# Patient Record
Sex: MALE | Race: WHITE
[De-identification: names, ages, dates, MRNs, and addresses within clinical notes are randomized per-mention and may not be internally consistent; named-entity substitution may affect disease eponyms.]

---

## 2017-11-30 ENCOUNTER — HOSPITAL ENCOUNTER (EMERGENCY)
Dept: HOSPITAL 62 - ER | Age: 57
Discharge: HOME | End: 2017-11-30
Payer: COMMERCIAL

## 2017-11-30 VITALS — SYSTOLIC BLOOD PRESSURE: 155 MMHG | DIASTOLIC BLOOD PRESSURE: 91 MMHG

## 2017-11-30 DIAGNOSIS — M25.561: ICD-10-CM

## 2017-11-30 DIAGNOSIS — M25.461: Primary | ICD-10-CM

## 2017-11-30 DIAGNOSIS — M79.89: ICD-10-CM

## 2017-11-30 DIAGNOSIS — M79.604: ICD-10-CM

## 2017-11-30 PROCEDURE — 93971 EXTREMITY STUDY: CPT

## 2017-11-30 PROCEDURE — 99284 EMERGENCY DEPT VISIT MOD MDM: CPT

## 2017-11-30 NOTE — RADIOLOGY REPORT (SQ)
EXAM DESCRIPTION:  KNEE RIGHT 4 VIEWS



COMPLETED DATE/TIME:  11/30/2017 8:38 pm



REASON FOR STUDY:  pain/swelling



COMPARISON:  None.



NUMBER OF VIEWS:  Four views.



TECHNIQUE:  AP, lateral, and both oblique radiographic images acquired of the right knee.



LIMITATIONS:  None.



FINDINGS:  MINERALIZATION: Normal.

BONES: No acute fracture or dislocation.  No worrisome bone lesions.

JOINT: Joint effusion.

SOFT TISSUES: No soft tissue swelling.  No radio-opaque foreign body.

OTHER: No other significant finding.



IMPRESSION:  Joint effusion.  No acute fracture.



TECHNICAL DOCUMENTATION:  JOB ID:  [id number]

 2011 [employer]- All Rights Reserved

## 2017-11-30 NOTE — ER DOCUMENT REPORT
ED General





- General


Chief Complaint: Swelling of Lower Extremity


Stated Complaint: KNEE PAIN,SWOLLEN RIGHT LEG


Time Seen by Provider: 11/30/17 21:30


Mode of Arrival: Ambulatory


Information source: Patient, Dr. Office


Notes: 





57-year-old male presents with complaints of knee swelling calf pain over the 

past few days.  Patient notes he felt a pop sensation and then his knee got 

larger.  Patient notes no shortness of breath difficulty breathing, states his 

left leg looks fine.  Patient does note tenderness in the calf.  Patient states 

he has no history of DVTs or PEs or any other risk factors for


Patient seen at outside facility sent in for evaluation for concern for DVT


TRAVEL OUTSIDE OF THE [place] IN LAST 30 DAYS: No





- HPI


Onset: Last week


Onset/Duration: Persistent


Quality of pain: Sharp


Severity: Mild


Pain Level: 1


Associated symptoms: Leg swelling


Exacerbated by: Movement, Walking


Relieved by: Denies


Similar symptoms previously: No


Recently seen / treated by doctor: Yes





- Related Data


Allergies/Adverse Reactions: 


 





No Known Allergies Allergy (Unverified 11/30/17 20:15)


 











Past Medical History





- Social History


Smoking Status: Never Smoker


Cigarette use (# per day): No


Chew tobacco use (# tins/day): No


Smoking Education Provided: No


Family History: Reviewed & Not Pertinent


Patient has suicidal ideation: No


Patient has homicidal ideation: No





- Past Medical History


Cardiac Medical History: Reports: Hx Hypertension


Renal/ Medical History: Denies: Hx Peritoneal Dialysis





Review of Systems





- Review of Systems


Notes: 





REVIEW OF SYSTEMS:


CONSTITUTIONAL :  Denies fever,  chills, or sweats.  Denies recent illness.


EENT:   Denies eye, ear, throat, or mouth pain or symptoms.  Denies nasal or 

sinus congestion or discharge.  Denies throat, tongue, or mouth swelling or 

difficulty swallowing.


CARDIOVASCULAR:  Denies chest pain.  Denies palpitations or racing or irregular 

heart beat.  Denies ankle edema.


RESPIRATORY:  Denies cough, cold, or chest congestion.  Denies shortness of 

breath, difficulty breathing, or wheezing.


GASTROINTESTINAL:  Denies abdominal pain or distention.  Denies nausea, vomiting

, or diarrhea.  Denies blood in vomitus, stools, or per rectum.  Denies black, 

tarry stools.  Denies constipation.  


GENITOURINARY:  Denies difficulty urinating, painful urination, burning, 

frequency, blood in urine, or discharge.


MUSCULOSKELETAL: Admits to right knee swelling


HEMATOLOGIC :   Denies easy bruising or bleeding.


LYMPHATIC:  Denies swollen, enlarged glands.


NEUROLOGICAL:  Denies confusion or altered mental status.  Denies passing out 

or loss of consciousness.  Denies dizziness or lightheadedness.  Denies 

headache.  Denies weakness or paralysis or loss of use of either side.  Denies 

problems with gait or speech.  Denies sensory loss, numbness, or tingling.  

Denies seizures.


PSYCHIATRIC:  Denies anxiety or stress.  Denies depression, suicidal ideation, 

or homicidal ideation.





ALL OTHER SYSTEMS REVIEWED AND NEGATIVE.





Dictation was performed using Dragon voice recognition software 





PHYSICAL EXAMINATION:





GENERAL: Well-appearing, well-nourished and in no acute distress.





HEAD: Atraumatic, normocephalic.





EYES: Pupils equal round and reactive to light, extraocular movements intact, 

sclera anicteric, conjunctiva are normal.





ENT: Nares patent, oropharynx clear without exudates.  Moist mucous membranes.





NECK: Normal range of motion, supple without lymphadenopathy





LUNGS: Breath sounds clear to auscultation bilaterally and equal.  No wheezes 

rales or rhonchi.





HEART: Regular rate and rhythm without murmurs





ABDOMEN: Soft, nontender, nondistended abdomen.  No guarding, no rebound.  No 

masses appreciated.





Musculoskeletal: Effusion noted of the right knee there is tenderness of the 

calf





NEUROLOGICAL: Cranial nerves grossly intact.  Normal speech, normal gait.  

Normal sensory, motor exams 





PSYCH: Normal mood, normal affect.





SKIN: Warm, Dry, normal turgor, no rashes or lesions noted.





Physical Exam





- Vital signs


Vitals: 


 











Temp Pulse Resp BP Pulse Ox


 


 98.7 F   84   20   164/92 H  94 


 


 11/30/17 20:21  11/30/17 20:21  11/30/17 20:21  11/30/17 20:21  11/30/17 20:21














Course





- Re-evaluation


Re-evalutation: 





12/02/17 14:47


Patient was emergently sent for an ultrasound which noted no blood clot, given 

the location of the swelling and the symptoms occurring after a popping 

sensation I do believe this may be secondary to an internal knee injury.  

Patient notes it does hurt with movement of the knee but he does have full 

range of motion and no laxity.  He will be placed in Ace wrap given follow-up 

with orthopedics for further evaluation and care.  Very strict return 

precautions have been provided to the patient








After performing a Medical Screening Examination, I estimate there is LOW risk 

for INTRACRANIAL HEMORRHAGE, UNSTABLE SPINE FRACTURE, CENTRAL CORD SYNDROME, 

CAUDA EQUINA, THORACIC AORTIC DISSECTION, PNEUMOTHORAX, PERFORATED BOWEL, 

RUPTURED ABDOMINAL AORTIC ANEURYSM, ACUTE TENDON RUPTURE, COMPARTMENT SYNDROME, 

or OPEN FRACTURE, thus I consider the discharge disposition reasonable. Also, 

there is no evidence or peritonitis, sepsis, or toxicity.  I have reevaluated 

this patient multiple times and no significant life threatening changes are 

noted. The patient and I have discussed the diagnosis and risks, and we agree 

with discharging home to follow-up with their primary doctor with the 

understanding that symptoms and presentations can change. We also discussed 

returning to the Emergency Department immediately if new or worsening symptoms 

occur. We have discussed the symptoms which are most concerning (e.g., bloody 

stool, fever, changing or worsening pain, vomiting) that necessitate immediate 

return.





- Vital Signs


Vital signs: 


 











Temp Pulse Resp BP Pulse Ox


 


 98.1 F   84   16   155/91 H  99 


 


 11/30/17 23:24  11/30/17 23:24  11/30/17 23:24  11/30/17 23:24  11/30/17 23:24














- Diagnostic Test


Radiology reviewed: Image reviewed, Reports reviewed





Discharge





- Discharge


Clinical Impression: 


 Knee effusion, right





Knee pain, acute


Qualifiers:


 Laterality: right Qualified Code(s): M25.561 - Pain in right knee





Condition: Stable


Disposition: HOME, SELF-CARE


Instructions:  Suspected Internal Knee Injury ([facility]), Knee Effusion ([facility])


Referrals: 


RIGOBERTO YING MD [ACTIVE STAFF] - Follow up in 3-5 days

## 2017-11-30 NOTE — RADIOLOGY REPORT (SQ)
EXAM DESCRIPTION:  VENOUS UNILATERAL LOWER



COMPLETED DATE/TIME:  11/30/2017 10:53 pm



REASON FOR STUDY:  right calf pain



COMPARISON:  None.



TECHNIQUE:  Dynamic and static gray scale and color images acquired of the right leg venous system. S
elected spectral images acquired with additional compression and augmentation maneuvers. The contrala
teral common femoral vein and saphenofemoral junction were also imaged. Images stored on PACS.



LIMITATIONS:  None.



FINDINGS:  COMMON FEMORAL: Normal phasicity, compression and augmentation. No visualized echogenic ma
terial on gray scale. No defects on color images.

FEMORAL: Normal compression and augmentation. No visualized echogenic material on gray scale. No defe
cts on color images.

POPLITEAL: Normal compression, augmentation. No visualized echogenic material on gray scale. No defec
ts on color images.

CALF VESSELS: Normal compression, augmentation. No visualized echogenic material on gray scale. No de
fects on color images.

GSV and SSV: Normal compression, augmentation. No visualized echogenic material on gray scale. No def
ects on color images.

ANY DEEP VENOUS INSUFFICIENCY: Not evaluated.

ANY EVIDENCE OF POPLITEAL CYST: No.

OTHER: No other significant finding.

CONTRALATERAL COMMON FEMORAL VEIN AND SAPHENOFEMORAL JUNCTION:

Normal phasicity, compression and augmentation. No visualized echogenic material on gray scale. No de
fects on color images.



IMPRESSION:  NO EVIDENCE OF DVT OR SVT IN THE RIGHT LEG.



TECHNICAL DOCUMENTATION:  JOB ID:  [id number]

[id number]

 2011 [employer]- All Rights Reserved

## 2017-12-12 ENCOUNTER — HOSPITAL ENCOUNTER (OUTPATIENT)
Dept: HOSPITAL 62 - OD | Age: 57
End: 2017-12-12
Attending: ORTHOPAEDIC SURGERY
Payer: COMMERCIAL

## 2017-12-12 DIAGNOSIS — Z01.812: ICD-10-CM

## 2017-12-12 DIAGNOSIS — Z01.818: ICD-10-CM

## 2017-12-12 DIAGNOSIS — Z01.810: Primary | ICD-10-CM

## 2017-12-12 LAB
ANION GAP SERPL CALC-SCNC: 13 MMOL/L (ref 5–19)
APPEARANCE UR: (no result)
BASOPHILS # BLD AUTO: 0 10^3/UL (ref 0–0.2)
BASOPHILS NFR BLD AUTO: 0.2 % (ref 0–2)
BILIRUB UR QL STRIP: NEGATIVE
BUN SERPL-MCNC: 11 MG/DL (ref 7–20)
CALCIUM: 9.3 MG/DL (ref 8.4–10.2)
CHLORIDE SERPL-SCNC: 101 MMOL/L (ref 98–107)
CO2 SERPL-SCNC: 27 MMOL/L (ref 22–30)
CREAT SERPL-MCNC: 0.72 MG/DL (ref 0.52–1.25)
EOSINOPHIL # BLD AUTO: 0.1 10^3/UL (ref 0–0.6)
EOSINOPHIL NFR BLD AUTO: 0.6 % (ref 0–6)
ERYTHROCYTE [DISTWIDTH] IN BLOOD BY AUTOMATED COUNT: 12.7 % (ref 11.5–14)
GLUCOSE SERPL-MCNC: 107 MG/DL (ref 75–110)
GLUCOSE UR STRIP-MCNC: NEGATIVE MG/DL
HCT VFR BLD CALC: 43.7 % (ref 37.9–51)
HGB BLD-MCNC: 15.3 G/DL (ref 13.5–17)
HGB HCT DIFFERENCE: 2.2
KETONES UR STRIP-MCNC: NEGATIVE MG/DL
LYMPHOCYTES # BLD AUTO: 1.2 10^3/UL (ref 0.5–4.7)
LYMPHOCYTES NFR BLD AUTO: 11.1 % (ref 13–45)
MCH RBC QN AUTO: 34.4 PG (ref 27–33.4)
MCHC RBC AUTO-ENTMCNC: 35 G/DL (ref 32–36)
MCV RBC AUTO: 98 FL (ref 80–97)
MONOCYTES # BLD AUTO: 0.8 10^3/UL (ref 0.1–1.4)
MONOCYTES NFR BLD AUTO: 7.1 % (ref 3–13)
NEUTROPHILS # BLD AUTO: 8.7 10^3/UL (ref 1.7–8.2)
NEUTS SEG NFR BLD AUTO: 81 % (ref 42–78)
NITRITE UR QL STRIP: NEGATIVE
PH UR STRIP: 8 [PH] (ref 5–9)
POTASSIUM SERPL-SCNC: 4.1 MMOL/L (ref 3.6–5)
PROT UR STRIP-MCNC: NEGATIVE MG/DL
RBC # BLD AUTO: 4.45 10^6/UL (ref 4.35–5.55)
SODIUM SERPL-SCNC: 141.4 MMOL/L (ref 137–145)
SP GR UR STRIP: 1.01
UROBILINOGEN UR-MCNC: NEGATIVE MG/DL (ref ?–2)
WBC # BLD AUTO: 10.7 10^3/UL (ref 4–10.5)

## 2017-12-12 PROCEDURE — 36415 COLL VENOUS BLD VENIPUNCTURE: CPT

## 2017-12-12 PROCEDURE — 80048 BASIC METABOLIC PNL TOTAL CA: CPT

## 2017-12-12 PROCEDURE — 93010 ELECTROCARDIOGRAM REPORT: CPT

## 2017-12-12 PROCEDURE — 85025 COMPLETE CBC W/AUTO DIFF WBC: CPT

## 2017-12-12 PROCEDURE — 71020: CPT

## 2017-12-12 PROCEDURE — 81001 URINALYSIS AUTO W/SCOPE: CPT

## 2017-12-12 PROCEDURE — 93005 ELECTROCARDIOGRAM TRACING: CPT

## 2017-12-12 NOTE — RADIOLOGY REPORT (SQ)
EXAM DESCRIPTION:  CHEST PA/LATERAL



COMPLETED DATE/TIME:  12/12/2017 10:12 am



REASON FOR STUDY:  PRE OP



COMPARISON:  None.



EXAM PARAMETERS:  NUMBER OF VIEWS: two views

TECHNIQUE: Digital Frontal and Lateral radiographic views of the chest acquired.

RADIATION DOSE: NA

LIMITATIONS: none



FINDINGS:  LUNGS AND PLEURA: No opacities, masses or pneumothorax. No pleural effusion.

MEDIASTINUM AND HILAR STRUCTURES: No masses or contour abnormalities.

HEART AND VASCULAR STRUCTURES: Heart normal size.  No evidence for failure.

BONES: No acute findings.  Diffuse thoracic spondylotic change

HARDWARE: None in the chest.

OTHER: No other significant finding.



IMPRESSION:  NO SIGNIFICANT RADIOGRAPHIC FINDING IN THE CHEST.



TECHNICAL DOCUMENTATION:  JOB ID:  [id number]

 2011 [employer]- All Rights Reserved

## 2017-12-13 ENCOUNTER — HOSPITAL ENCOUNTER (OUTPATIENT)
Dept: HOSPITAL 62 - OROUT | Age: 57
Discharge: HOME | End: 2017-12-13
Attending: ORTHOPAEDIC SURGERY
Payer: COMMERCIAL

## 2017-12-13 VITALS — SYSTOLIC BLOOD PRESSURE: 130 MMHG | DIASTOLIC BLOOD PRESSURE: 86 MMHG

## 2017-12-13 DIAGNOSIS — M23.205: Primary | ICD-10-CM

## 2017-12-13 DIAGNOSIS — M25.561: ICD-10-CM

## 2017-12-13 DIAGNOSIS — M22.41: ICD-10-CM

## 2017-12-13 DIAGNOSIS — Z79.899: ICD-10-CM

## 2017-12-13 DIAGNOSIS — I10: ICD-10-CM

## 2017-12-13 DIAGNOSIS — M23.200: ICD-10-CM

## 2017-12-13 PROCEDURE — 0SBC4ZZ EXCISION OF RIGHT KNEE JOINT, PERCUTANEOUS ENDOSCOPIC APPROACH: ICD-10-PCS | Performed by: ORTHOPAEDIC SURGERY

## 2017-12-13 PROCEDURE — S0028 INJECTION, FAMOTIDINE, 20 MG: HCPCS

## 2017-12-13 PROCEDURE — 29880 ARTHRS KNE SRG MNISECTMY M&L: CPT

## 2017-12-13 RX ADMIN — FENTANYL CITRATE ONE MCG: 50 INJECTION INTRAMUSCULAR; INTRAVENOUS at 09:15

## 2017-12-13 RX ADMIN — FENTANYL CITRATE ONE MCG: 50 INJECTION INTRAMUSCULAR; INTRAVENOUS at 09:10

## 2017-12-13 NOTE — OPERATIVE REPORT
Operative Report


DATE OF SURGERY: 12/13/17


PREOPERATIVE DIAGNOSIS: Right medial meniscal tear


POSTOPERATIVE DIAGNOSIS: Right medial meniscal tear.  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 

malacia the medial compartment.  Intact ACL.  Grade 2 chondral malacia lateral 

compartment.  Lateral meniscal tear.  Grade 3 chondral malacia patellofemoral


OPERATION: Arthroscopic right partial medial and lateral meniscectomy


SURGEON: RIGOBERTO YING


ANESTHESIA: LMAC


ESTIMATED BLOOD LOSS: Minimal


PROCEDURE: 





With the patient supine on the operating table the right lower extremity is 

prepped and draped in sterile fashion.  The knee is insufflated with 

combination of Marcaine, Xylocaine, and epinephrine for anesthetic as well as 

vasoconstriction.  Subsequent medial lateral infrapatellar portals are created 

in the right knee for the introduction of arthroscope and debridement 

instrumentation.  The joint is examined in systematic fashion findings as 

above.  Using combination of basket Bernardo, mechanical shaver, electric 

frequency ablation probe a partial medial meniscectomy was performed from 

approximately 4:00 to 12:00 in the face of the dial.  Partial lateral 

meniscectomy was performed from approximately 8:00 to 12:00 in the face of the 

dial.  The joint is again examined in systematic fashion no new findings.  The 

interpretation is removed.  The portals reapproximated with interrupted nylon.  

A sterile compressive dressing was applied.  The patient returned to PACU in 

satisfactory condition.

## 2018-11-14 LAB
ANION GAP SERPL CALC-SCNC: 13 MMOL/L (ref 5–19)
APPEARANCE UR: (no result)
APTT PPP: YELLOW S
BILIRUB UR QL STRIP: NEGATIVE
BUN SERPL-MCNC: 16 MG/DL (ref 7–20)
CALCIUM: 9.5 MG/DL (ref 8.4–10.2)
CHLORIDE SERPL-SCNC: 101 MMOL/L (ref 98–107)
CO2 SERPL-SCNC: 27 MMOL/L (ref 22–30)
ERYTHROCYTE [DISTWIDTH] IN BLOOD BY AUTOMATED COUNT: 13 % (ref 11.5–14)
GLUCOSE SERPL-MCNC: 106 MG/DL (ref 75–110)
GLUCOSE UR STRIP-MCNC: NEGATIVE MG/DL
HCT VFR BLD CALC: 46.2 % (ref 37.9–51)
HGB BLD-MCNC: 16.2 G/DL (ref 13.5–17)
KETONES UR STRIP-MCNC: NEGATIVE MG/DL
MCH RBC QN AUTO: 34.5 PG (ref 27–33.4)
MCHC RBC AUTO-ENTMCNC: 35 G/DL (ref 32–36)
MCV RBC AUTO: 99 FL (ref 80–97)
NITRITE UR QL STRIP: NEGATIVE
PH UR STRIP: 8 [PH] (ref 5–9)
PLATELET # BLD: 187 10^3/UL (ref 150–450)
POTASSIUM SERPL-SCNC: 4.8 MMOL/L (ref 3.6–5)
PROT UR STRIP-MCNC: NEGATIVE MG/DL
RBC # BLD AUTO: 4.68 10^6/UL (ref 4.35–5.55)
SODIUM SERPL-SCNC: 141.4 MMOL/L (ref 137–145)
SP GR UR STRIP: 1.02
UROBILINOGEN UR-MCNC: NEGATIVE MG/DL (ref ?–2)
WBC # BLD AUTO: 6.1 10^3/UL (ref 4–10.5)

## 2018-11-14 NOTE — RADIOLOGY REPORT (SQ)
EXAM DESCRIPTION:  CHEST PA/LATERAL



COMPLETED DATE/TIME:  11/14/2018 9:27 am



REASON FOR STUDY:  PRE-OP



COMPARISON:  None.



EXAM PARAMETERS:  NUMBER OF VIEWS: two views

TECHNIQUE: Digital Frontal and Lateral radiographic views of the chest acquired.

RADIATION DOSE: NA

LIMITATIONS: none



FINDINGS:  LUNGS AND PLEURA: No opacities, masses or pneumothorax. No pleural effusion.

MEDIASTINUM AND HILAR STRUCTURES: No masses or contour abnormalities.

HEART AND VASCULAR STRUCTURES: Heart normal size.  No evidence for failure.

BONES: No acute findings.

HARDWARE: None in the chest.

OTHER: No other significant finding.



IMPRESSION:  NO SIGNIFICANT RADIOGRAPHIC FINDING IN THE CHEST.



TECHNICAL DOCUMENTATION:  JOB ID:  [id number]

 2011 [employer]- All Rights Reserved



Reading location - IP/workstation name: [email or web address]

## 2018-11-15 NOTE — EKG REPORT
SEVERITY:- OTHERWISE NORMAL ECG -

SINUS RHYTHM

BORDERLINE LEFT AXIS DEVIATION

:

Confirmed by: Martine Munoz 15-Nov-2018 09:00:49

## 2018-11-21 ENCOUNTER — HOSPITAL ENCOUNTER (OUTPATIENT)
Dept: HOSPITAL 62 - OROUT | Age: 58
Discharge: HOME | End: 2018-11-21
Attending: ORTHOPAEDIC SURGERY
Payer: COMMERCIAL

## 2018-11-21 VITALS — SYSTOLIC BLOOD PRESSURE: 156 MMHG | DIASTOLIC BLOOD PRESSURE: 84 MMHG

## 2018-11-21 DIAGNOSIS — Z79.899: ICD-10-CM

## 2018-11-21 DIAGNOSIS — M23.201: Primary | ICD-10-CM

## 2018-11-21 DIAGNOSIS — M22.42: ICD-10-CM

## 2018-11-21 DIAGNOSIS — M25.562: ICD-10-CM

## 2018-11-21 DIAGNOSIS — M23.204: ICD-10-CM

## 2018-11-21 DIAGNOSIS — Z79.1: ICD-10-CM

## 2018-11-21 DIAGNOSIS — Z79.891: ICD-10-CM

## 2018-11-21 DIAGNOSIS — I10: ICD-10-CM

## 2018-11-21 PROCEDURE — 93005 ELECTROCARDIOGRAM TRACING: CPT

## 2018-11-21 PROCEDURE — 81001 URINALYSIS AUTO W/SCOPE: CPT

## 2018-11-21 PROCEDURE — 71046 X-RAY EXAM CHEST 2 VIEWS: CPT

## 2018-11-21 PROCEDURE — 80048 BASIC METABOLIC PNL TOTAL CA: CPT

## 2018-11-21 PROCEDURE — 36415 COLL VENOUS BLD VENIPUNCTURE: CPT

## 2018-11-21 PROCEDURE — 85027 COMPLETE CBC AUTOMATED: CPT

## 2018-11-21 PROCEDURE — 93010 ELECTROCARDIOGRAM REPORT: CPT

## 2018-11-21 PROCEDURE — 29880 ARTHRS KNE SRG MNISECTMY M&L: CPT

## 2018-11-21 RX ADMIN — FENTANYL CITRATE ONE MCG: 50 INJECTION INTRAMUSCULAR; INTRAVENOUS at 08:09

## 2018-11-21 RX ADMIN — FENTANYL CITRATE ONE MCG: 50 INJECTION INTRAMUSCULAR; INTRAVENOUS at 08:14

## 2018-11-21 NOTE — OPERATIVE REPORT
Operative Report


DATE OF SURGERY: 11/21/18


PREOPERATIVE DIAGNOSIS: Left medial meniscal tear


POSTOPERATIVE DIAGNOSIS: Left medial meniscal tear.  Grade 3 chondral malacia 

the medial femoral compartment.  Intact ACL.  Grade I chondromalacia lateral 

compartment.  Lateral meniscal tear.  Grade 2 chondral malacia the 

patellofemoral compartment


OPERATION: Arthroscopic partial left medial and lateral meniscectomy


SURGEON: RIGOBERTO YING


ANESTHESIA: LMAC


ESTIMATED BLOOD LOSS: Minimal


PROCEDURE: 





With the patient supine on the operative table left lower extremities prepped 

and draped in a sterile fashion.  The knee is insufflated with a combination of 

Marcaine, Xylocaine, and epinephrine.  Subsequent medial and lateral 

infrapatellar portals are created for the introduction of arthroscope and 

debridements instrumentation.  The joint is examined in systematic fashion 

findings as above.  Using combination of basket Bernardo, mechanical shaver, 

electric frequency ablation probe a partial medial meniscectomy was performed 

from approximately 8:00 to 12:00 on the face of the dial.  Similarly a partial 

lateral meniscectomy was performed from approximately 5:00 to 12:00 on the face 

of the dial.  The joint is again examined in systematic fashion with no new 

findings.  The instrumentation was removed.  The portals reapproximated 

interrupted nylon.  A sterile compressive dressing is applied.  The patient's 

return to the PACU in satisfactory condition.

## 2018-11-21 NOTE — DISCHARGE SUMMARY
Discharge Summary (SDC)





- Discharge


Final Diagnosis: 





Left medial meniscal tear


Date of Surgery: 11/21/18


Discharge Date: 11/21/18


Condition: Good


Treatment or Instructions: 


Removed compressive wrap on Saturday.  Underlying OpSite can remain in place 

until you return to the office.


Prescriptions: 


Oxycodone HCl/Acetaminophen [Percocet 5-325 mg Tablet] 1 tab PO Q6 PRN #25 tab


 PRN Reason: 


Referrals: 


CAROLYN PEARL MD [Primary Care Provider] - 


Discharge Diet: As Tolerated, Regular


Respiratory Treatments at Home: Deep Breathing/Coughing


Discharge Activity: Activity As Tolerated, Balance Activity w/Rest, No tub bath


Home Care Assistance: None Needed


Report the Following to Your Physician Immediately: Shortness of Breath, Fever 

over 101 Degrees, Drainage-Foul Smelling

## 2020-04-30 ENCOUNTER — HOSPITAL ENCOUNTER (EMERGENCY)
Dept: HOSPITAL 62 - ER | Age: 60
Discharge: HOME | End: 2020-04-30
Payer: COMMERCIAL

## 2020-04-30 VITALS — SYSTOLIC BLOOD PRESSURE: 154 MMHG | DIASTOLIC BLOOD PRESSURE: 94 MMHG

## 2020-04-30 DIAGNOSIS — R53.1: ICD-10-CM

## 2020-04-30 DIAGNOSIS — S01.552A: ICD-10-CM
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
## 2020-04-30 DIAGNOSIS — I10: ICD-10-CM

## 2020-04-30 DIAGNOSIS — Z87.891: ICD-10-CM

## 2020-04-30 DIAGNOSIS — R20.2: ICD-10-CM

## 2020-04-30 DIAGNOSIS — R56.9: Primary | ICD-10-CM
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
## 2020-04-30 DIAGNOSIS — F10.29: ICD-10-CM

## 2020-04-30 DIAGNOSIS — Z91.048: ICD-10-CM
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
## 2020-04-30 LAB
ADD MANUAL DIFF: NO
ALBUMIN SERPL-MCNC: 4.5 G/DL (ref 3.5–5)
ALP SERPL-CCNC: 102 U/L (ref 38–126)
ANION GAP SERPL CALC-SCNC: 12 MMOL/L (ref 5–19)
AST SERPL-CCNC: 171 U/L (ref 17–59)
BASOPHILS # BLD AUTO: 0 10^3/UL (ref 0–0.2)
BASOPHILS NFR BLD AUTO: 0.4 % (ref 0–2)
BILIRUB DIRECT SERPL-MCNC: 0.2 MG/DL (ref 0–0.4)
BILIRUB SERPL-MCNC: 0.9 MG/DL (ref 0.2–1.3)
BUN SERPL-MCNC: 16 MG/DL (ref 7–20)
CALCIUM: 9.7 MG/DL (ref 8.4–10.2)
CHLORIDE SERPL-SCNC: 97 MMOL/L (ref 98–107)
CO2 SERPL-SCNC: 26 MMOL/L (ref 22–30)
EOSINOPHIL # BLD AUTO: 0 10^3/UL (ref 0–0.6)
EOSINOPHIL NFR BLD AUTO: 0.7 % (ref 0–6)
ERYTHROCYTE [DISTWIDTH] IN BLOOD BY AUTOMATED COUNT: 13.2 % (ref 11.5–14)
ETHANOL SERPL-MCNC: < 10 MG/DL
GLUCOSE SERPL-MCNC: 137 MG/DL (ref 75–110)
HCT VFR BLD CALC: 43.8 % (ref 37.9–51)
HGB BLD-MCNC: 15.7 G/DL (ref 13.5–17)
LYMPHOCYTES # BLD AUTO: 1.5 10^3/UL (ref 0.5–4.7)
LYMPHOCYTES NFR BLD AUTO: 21.9 % (ref 13–45)
MCH RBC QN AUTO: 35 PG (ref 27–33.4)
MCHC RBC AUTO-ENTMCNC: 35.8 G/DL (ref 32–36)
MCV RBC AUTO: 98 FL (ref 80–97)
MONOCYTES # BLD AUTO: 0.7 10^3/UL (ref 0.1–1.4)
MONOCYTES NFR BLD AUTO: 9.8 % (ref 3–13)
NEUTROPHILS # BLD AUTO: 4.8 10^3/UL (ref 1.7–8.2)
NEUTS SEG NFR BLD AUTO: 67.2 % (ref 42–78)
PLATELET # BLD: 170 10^3/UL (ref 150–450)
POTASSIUM SERPL-SCNC: 3.5 MMOL/L (ref 3.6–5)
PROT SERPL-MCNC: 8 G/DL (ref 6.3–8.2)
RBC # BLD AUTO: 4.49 10^6/UL (ref 4.35–5.55)
TOTAL CELLS COUNTED % (AUTO): 100 %
WBC # BLD AUTO: 7.1 10^3/UL (ref 4–10.5)

## 2020-04-30 PROCEDURE — 80053 COMPREHEN METABOLIC PANEL: CPT

## 2020-04-30 PROCEDURE — 99291 CRITICAL CARE FIRST HOUR: CPT

## 2020-04-30 PROCEDURE — 85025 COMPLETE CBC W/AUTO DIFF WBC: CPT

## 2020-04-30 PROCEDURE — 99292 CRITICAL CARE ADDL 30 MIN: CPT

## 2020-04-30 PROCEDURE — 96374 THER/PROPH/DIAG INJ IV PUSH: CPT

## 2020-04-30 PROCEDURE — 80307 DRUG TEST PRSMV CHEM ANLYZR: CPT

## 2020-04-30 PROCEDURE — 70450 CT HEAD/BRAIN W/O DYE: CPT

## 2020-04-30 PROCEDURE — 36415 COLL VENOUS BLD VENIPUNCTURE: CPT

## 2020-04-30 NOTE — RADIOLOGY REPORT (SQ)
CT of the head: 4/30/2020 4:32 AM CDT



HISTORY: 59-year-old patient with seizures.



COMPARISON: None available



TECHNIQUE: Multiple axial contiguous images were obtained through

the head without intravenous contrast administered. This exam was

performed according to our departmental dose-optimization

program, which includes automated exposure control, adjustment of

the mA and/or KV according to the patient's size and/or use of

iterative reconstruction technique.



FINDINGS: The ventricles are within normal limits for size.  Both

orbits appear unremarkable. The mastoid air cells appear clear.

There is mild mucoperiosteal thickening of the ethmoid and

maxillary of follicular clicky just had a seizure site sinuses.

The calvarium is intact. No extra-axial fluid collection is seen.

 The gray-white matter differentiation is within normal limits.

No midline shift or mass effect is apparent. There are no

findings to suggest acute intracranial hemorrhage.



IMPRESSION: No acute intracranial hemorrhage is seen.

## 2020-04-30 NOTE — ER DOCUMENT REPORT
ED General





- General


Chief Complaint: Probable Seizure


Stated Complaint: POSS SEIZURE


Time Seen by Provider: 04/30/20 03:37


Primary Care Provider: 


CAROLYN PEARL MD [Primary Care Provider] - Follow up as needed


Mode of Arrival: Medic


Information source: Patient, Emergency Med Personnel


Notes: 


risa Lowery RN 


PATIENT ARRIVES TO ROOM 10 VIA EMS WITH COMPLAINTS OF A POSSIBLE SEIZURE 

ACTIVITY, PATIENT IS CONFUSED AND UNABLE TO RECALL AGE, DATE, OR PREVIOUS 

ACTIVITY. NURSE ABLE TO SPEAK WITH SIGNIFICANT OTHER ARIAN PALENCIA WHO WITNESSED 

THE INCIDENT, ARIAN STATES THAT THE PATIENT WAS WATCHING TV AND SUDDENLY BECAME 

UNRESPONSIVE, ARM BECAME CONTRACTED AND PATIENT BEGAN TO SHAKE, PATIENT WAS 

INCONTINENT OF URINE AT HOME. PER ARIAN PALENCIA PATIENT HAS RECENTLY RETIRED AND 

WAS ALSO RECENTLY LAID OFF, PER ARIAN PATIENT DRINKS HEAVY EVERY DAY EXCEPT OVER

THE LAST 2 DAYS THE PATIENT HAS NOT BEEN FEELING WELL AND SUDDENLY STOPPED ALL 

ETOH INTAKE. PER ARIAN PATIENT HAD SOME RIGHT SIDED NUMBNESS OVER THE LAST WEEK 

THAT HAS AT THIS TIME RESOLVED. PATIENT DOES COMPLAIN OF TONGUE NUMBNESS AT THIS

TIME, PATIENT NOTED TO HAVE BITE LACERATION THAT IS BLEEDING SOME TO LEFT SIDE 

OF TONGUE. PATIENT DOES HAVE A HX OF HTN AND PER PATIENT AND ARIAN PATIENT DOES 

NOT TAKE BP MEDICATIONS AS PRESCRIBED. PATIENT IS AWAKE AND ALERT AND IS ABLE TO

RECALL MORE AS TIME PASSES. NO DISTRESS NOTED AT THIS TIME. WILL CONTINUE TO 

MONITOR.  





My note;


59-year-old  male arrives by EMS with chief complaint of wife who 

reports patient was having seizures with contractures of his extremities and 

clenching teeth and biting tongue.  Patient for the last 2 days been having 

right arm paresthesias.  Patient decided to stop drinking alcohol because he 

thought the symptoms were coming from his drinking.  Patient stopped smoking 

cigarettes over 15 years ago but continued to drink at least 12 pack of beer 

daily.  Patient denies any other alcohol at this time.  Patient denies any ill

egal drugs.  Patient reports he is been drinking since he was a teenager.  Blood

pressure was 157/107


TRAVEL OUTSIDE OF THE [place] IN LAST 30 DAYS: No





- HPI


Onset: Just prior to arrival


Onset/Duration: Sudden


Quality of pain: No pain


Severity: None


Pain Level: Denies


Associated symptoms: Other - Bite to tongue


Exacerbated by: Denies


Relieved by: Denies


Similar symptoms previously: No


Recently seen / treated by doctor: No





- Related Data


Allergies/Adverse Reactions: 


                                        





poison ivy extract Allergy (Severe, Verified 11/20/18 10:09)


   rash











Past Medical History





- General


Information source: Patient





- Social History


Smoking Status: Former Smoker


Cigarette use (# per day): No


Chew tobacco use (# tins/day): No


Smoking Education Provided: No


Frequency of alcohol use: Heavy


Drug Abuse: None


Lives with: Family


Family History: Reviewed & Not Pertinent


Patient has homicidal ideation: No





- Past Medical History


Cardiac Medical History: Reports: Hx Hypertension


   Denies: Hx Coronary Artery Disease, Hx Heart Attack


Pulmonary Medical History: 


   Denies: Hx Asthma, Hx Bronchitis, Hx COPD, Hx Pneumonia


Neurological Medical History: Denies: Hx Cerebrovascular Accident, Hx Seizures


Renal/ Medical History: Denies: Hx Peritoneal Dialysis


Musculoskeletal Medical History: Reports Hx Arthritis - right knee





- Immunizations


Hx Diphtheria, Pertussis, Tetanus Vaccination: Yes





Review of Systems





- Review of Systems


Constitutional: See HPI, Weakness


EENT: No symptoms reported, Other - sore sore tongue


Cardiovascular: No symptoms reported


Respiratory: No symptoms reported


Gastrointestinal: No symptoms reported


Genitourinary: No symptoms reported


Male Genitourinary: No symptoms reported


Musculoskeletal: No symptoms reported, See HPI, Other - Right upper extremity 

paresthesias


Skin: No symptoms reported


Hematologic/Lymphatic: No symptoms reported


Neurological/Psychological: See HPI, Confusion





Physical Exam





- Vital signs


Vitals: 


                                        











Temp


 


 98.7 F 


 


 04/30/20 03:03











Interpretation: Hypertensive, Tachycardic





- General


General appearance: Alert





- HEENT


Head: Normocephalic, Atraumatic


Eyes: Normal


Pupils: PERRL


Sinus: Normal


Nasal: Normal


Mouth/Lips: Other - angel with left surface bite and under tongue


Pharynx: Normal


Neck: Normal





- Respiratory


Respiratory status: No respiratory distress


Chest status: Nontender


Breath sounds: Normal


Chest palpation: Normal





- Cardiovascular


Rhythm: Regular


Heart sounds: Normal auscultation


Murmur: No





- Abdominal


Inspection: Normal


Distension: No distension


Bowel sounds: Normal


Tenderness: Nontender


Organomegaly: No organomegaly





- Genitourinary


Scrotum: Other - deferred





- Back


Back: Normal





- Extremities


General upper extremity: Normal inspection


General lower extremity: Normal inspection





- Neurological


Neuro grossly intact: Yes


Cognition: Normal


Orientation: AAOx4


[place] Coma Scale Eye Opening: Spontaneous


[place] Coma Scale Verbal: Oriented


[place] Coma Scale Motor: Obeys Commands


[place] Coma Scale Total: 15


Speech: Normal


Motor strength normal: LUE, RUE, LLE, RLE


Sensory: Normal





- Psychological


Associated symptoms: Normal affect





- Skin


Skin Temperature: Warm


Skin Moisture: Dry





Course





- Vital Signs


Vital signs: 


                                        











Temp Pulse Resp BP Pulse Ox


 


 98.7 F      19   157/107 H  96 


 


 04/30/20 03:03     04/30/20 03:12  04/30/20 03:12  04/30/20 03:12














- Laboratory


Result Diagrams: 


                                 04/30/20 03:16





                                 04/30/20 03:16


Laboratory results interpreted by me: 


                                        











  04/30/20 04/30/20





  03:16 03:16


 


MCV  98 H 


 


MCH  35.0 H 


 


Sodium   134.9 L


 


Potassium   3.5 L


 


Chloride   97 L


 


Glucose   137 H


 


AST   171 H


 


ALT   127 H














- Diagnostic Test


Radiology reviewed: Reports reviewed





Critical Care Note





- Critical Care Note


Total time excluding time spent on procedures (mins): 90


Comments: 





pressure decreased 150/98..pt reports it usually runs @ this way..? non 

compliance on HTN meds





Discharge





- Discharge


Clinical Impression: 


 Seizure





Alcohol dependence


Qualifiers:


 Substance use status: unspecified alcohol-induced disorder Qualified Code(s): 

F10.29 - Alcohol dependence with unspecified alcohol-induced disorder





Condition: Good


Disposition: HOME, SELF-CARE


Additional Instructions: 


Follow with personal doctor return to ER as needed take medicines as directed 

also try to stop drinking and smaller amounts rather than cold turkey because 

this causes the increased risk for seizures.  And DTs try to take multiple B 

vitamin tablets 1 tablet daily with your meals.


Prescriptions: 


Lorazepam [Ativan 1 mg Tablet] 1 mg PO HSP PRN #10 tab


 PRN Reason: 


Phenytoin Sodium Extended [Dilantin 100 mg Capsule.er] 100 mg PO Q8 #90 capsule


Forms:  Return to Work


Referrals: 


CAROLYN PEARL MD [Primary Care Provider] - Follow up as needed